# Patient Record
Sex: MALE | Race: OTHER | URBAN - METROPOLITAN AREA
[De-identification: names, ages, dates, MRNs, and addresses within clinical notes are randomized per-mention and may not be internally consistent; named-entity substitution may affect disease eponyms.]

---

## 2018-08-22 ENCOUNTER — OFFICE VISIT (OUTPATIENT)
Dept: INTERNAL MEDICINE CLINIC | Facility: CLINIC | Age: 18
End: 2018-08-22
Payer: COMMERCIAL

## 2018-08-22 VITALS
OXYGEN SATURATION: 97 % | WEIGHT: 253.19 LBS | DIASTOLIC BLOOD PRESSURE: 76 MMHG | BODY MASS INDEX: 33.92 KG/M2 | SYSTOLIC BLOOD PRESSURE: 116 MMHG | HEIGHT: 72.5 IN | HEART RATE: 80 BPM | TEMPERATURE: 98 F

## 2018-08-22 DIAGNOSIS — M25.50 ARTHRALGIA, UNSPECIFIED JOINT: ICD-10-CM

## 2018-08-22 DIAGNOSIS — Z76.89 ENCOUNTER TO ESTABLISH CARE: Primary | ICD-10-CM

## 2018-08-22 PROBLEM — M25.571 RIGHT ANKLE PAIN: Status: ACTIVE | Noted: 2018-08-22

## 2018-08-22 PROBLEM — M25.531 RIGHT WRIST PAIN: Status: ACTIVE | Noted: 2018-08-22

## 2018-08-22 LAB
ALBUMIN SERPL BCP-MCNC: 4.5 G/DL (ref 3.5–4.8)
ALBUMIN/GLOB SERPL: 1.7 {RATIO} (ref 1–2)
ALP SERPL-CCNC: 57 U/L (ref 39–325)
ALT SERPL-CCNC: 22 U/L (ref 17–63)
ANION GAP SERPL CALC-SCNC: 8 MMOL/L (ref 0–18)
AST SERPL-CCNC: 25 U/L (ref 15–41)
BASOPHILS # BLD: 0.1 K/UL (ref 0–0.2)
BASOPHILS NFR BLD: 1 %
BILIRUB SERPL-MCNC: 0.1 MG/DL (ref 0.3–1.2)
BUN SERPL-MCNC: 13 MG/DL (ref 8–20)
BUN/CREAT SERPL: 12.3 (ref 10–20)
CALCIUM SERPL-MCNC: 9.8 MG/DL (ref 8.5–10.5)
CHLORIDE SERPL-SCNC: 102 MMOL/L (ref 95–110)
CO2 SERPL-SCNC: 29 MMOL/L (ref 22–32)
CREAT SERPL-MCNC: 1.06 MG/DL (ref 0.5–1.5)
CRP SERPL HS-MCNC: 1.6 MG/L (ref 0–7.5)
EOSINOPHIL # BLD: 0.2 K/UL (ref 0–0.7)
EOSINOPHIL NFR BLD: 2 %
ERYTHROCYTE [DISTWIDTH] IN BLOOD BY AUTOMATED COUNT: 12.7 % (ref 11–15)
ERYTHROCYTE [SEDIMENTATION RATE] IN BLOOD: 4 MM/HR (ref 0–15)
GLOBULIN PLAS-MCNC: 2.6 G/DL (ref 2.5–3.7)
GLUCOSE SERPL-MCNC: 97 MG/DL (ref 70–99)
HCT VFR BLD AUTO: 45.3 % (ref 41–52)
HGB BLD-MCNC: 14.9 G/DL (ref 13.5–17.5)
LYMPHOCYTES # BLD: 2.9 K/UL (ref 1–4)
LYMPHOCYTES NFR BLD: 33 %
MCH RBC QN AUTO: 27.1 PG (ref 27–32)
MCHC RBC AUTO-ENTMCNC: 32.9 G/DL (ref 32–37)
MCV RBC AUTO: 82.5 FL (ref 80–100)
MONOCYTES # BLD: 0.7 K/UL (ref 0–1)
MONOCYTES NFR BLD: 7 %
NEUTROPHILS # BLD AUTO: 5.1 K/UL (ref 1.8–7.7)
NEUTROPHILS NFR BLD: 57 %
OSMOLALITY UR CALC.SUM OF ELEC: 288 MOSM/KG (ref 275–295)
PATIENT FASTING: NO
PLATELET # BLD AUTO: 269 K/UL (ref 140–400)
PMV BLD AUTO: 7.5 FL (ref 7.4–10.3)
POTASSIUM SERPL-SCNC: 4.4 MMOL/L (ref 3.3–5.1)
PROT SERPL-MCNC: 7.1 G/DL (ref 5.9–8.4)
RBC # BLD AUTO: 5.49 M/UL (ref 4.5–5.9)
SODIUM SERPL-SCNC: 139 MMOL/L (ref 136–144)
WBC # BLD AUTO: 8.9 K/UL (ref 4–11)

## 2018-08-22 PROCEDURE — 99203 OFFICE O/P NEW LOW 30 MIN: CPT | Performed by: INTERNAL MEDICINE

## 2018-08-22 PROCEDURE — 36415 COLL VENOUS BLD VENIPUNCTURE: CPT | Performed by: INTERNAL MEDICINE

## 2018-08-23 NOTE — PATIENT INSTRUCTIONS
Encounter to establish care  (primary encounter diagnosis)  Arthralgia, unspecified joint check crp ,esr    right wrist pain- negative test for carpal tunnel- use wrist splint at night , ibuprofen as needed , if  persist follow up     Right ankle pain- exm

## 2018-08-23 NOTE — PROGRESS NOTES
HPI:    Patient ID: Irene De La Cruz is a 19year old male. HPI Right ankle pain  ongoing for few months , worse when he walks long distance . No pain at rest . Per him pain radiates to his leg . Denies any pain now . No injury . Used to play football not bruise/bleed easily. Psychiatric/Behavioral: Negative for agitation, behavioral problems, confusion, hallucinations and sleep disturbance. The patient is not nervous/anxious. No current outpatient prescriptions on file.   Allergies:No Known discharge. No scleral icterus. Neck: Normal range of motion. Neck supple. No JVD present. No tracheal tenderness present. No tracheal deviation present. No thyroid mass and no thyromegaly present.    Cardiovascular: Normal rate, regular rhythm, normal hea behavior is normal.   Vitals reviewed.            ASSESSMENT/PLAN:   Encounter to establish care  (primary encounter diagnosis)  Arthralgia, unspecified joint check crp ,esr    right wrist pain- negative test for carpal tunnel- use wrist splint at night , i

## 2025-04-24 NOTE — H&P
[facility] - Gastroenterology                                                                                                               Reason for consult: abdominal pain    Requesting physician or provider: BRAXTON WATKINS MD    Chief Complaint   Patient presents with    Consult     Sharp Abdominal pain/ pt believes it a small bump on abdomen  and Groin        HPI:   Rommel MATOS is a 25 year old year-old male with medical history of depression.     he is here today for evaluation of lower abdominal pain x2 months.   Has been keeping a food diary, has not found any specific triggers.  Has sharp pain the AM, feels achy the rest of the day.  Baseline is 1-2 bowel movements per day, now going a few times a day, having feeling of incomplete evacuation.  Added a fiber supplement, helps keep stool solid.       Patient denies symptoms of nausea, vomiting, dyspepsia, dysphagia, odynophagia, globus sensation, heartburn, hematemesis, hematochezia, or melena. he denies recent change in appetite, fever or unintentional weight loss.      Last colonoscopy: none   Last EGD: none     NSAIDS: none  Tobacco: none  Alcohol: social   Marijuana: daily   Illicit drugs: none    No family history of GI malignancy or IBD.     No history of adverse reaction to sedation  No ELMA  No anticoagulants/antiplatelet  No pacemaker/defibrillator    Wt Readings from Last 6 Encounters:   04/24/25 237 lb (107.5 kg)   08/22/18 253 lb 3.2 oz (114.9 kg)        History, Medications, Allergies, ROS:      Past Medical History[1]   Past Surgical History[2]   Family Hx: Family History[3]   Social History: Short Social Hx on File[4]     Medications (Active prior to today's visit):  Current Medications[5]    Allergies:  Allergies[6]    ROS:   CONSTITUTIONAL: negative for fevers, chills, sweats  EYES Negative for scleral icterus or redness, and diplopia  HEENT:  Negative for hoarseness  RESPIRATORY: Negative for cough and severe shortness of breath  CARDIOVASCULAR: Negative for crushing sub-sternal chest pain  GASTROINTESTINAL: See HPI  GENITOURINARY: Negative for dysuria  MUSCULOSKELETAL: Negative for arthralgias and myalgias  SKIN: Negative for jaundice, rash or pruritus  NEUROLOGICAL: Negative for dizziness and headaches  BEHAVIOR/PSYCH: Negative for psychotic behavior    PHYSICAL EXAM:   Blood pressure 118/75, pulse 72, weight 237 lb (107.5 kg).    GEN: Alert, no acute distress, well-nourished   ABDOMEN: Soft, symmetrical, non-tender without distention or guarding. No scars or lesions. Umbilicus is midline without herniation. Normoactive bowel sounds are present.  MSK: No erythema, no warmth, no swelling of joints  SKIN: No jaundice, no erythema, no rashes, no lesions  HEMATOLOGIC: No bleeding, no bruising  NEURO: Alert and interactive, ARREOLA  PSYCH: appropriate mood & affect    Labs/Imaging/Procedures:     Patient's pertinent labs and imaging were reviewed and discussed with patient today.        .  ASSESSMENT/PLAN:   Rommel AMTOS is a 25 year old year-old male with medical history of depression.     1. RLQ abdominal pain  - CT ABDOMEN+PELVIS(CONTRAST ONLY)(CPT=74177); Future    2. Change in bowel habit  - CT ABDOMEN+PELVIS(CONTRAST ONLY)(CPT=74177); Future     Recommend starting with CT scan for further evaluation.  Continue daily fiber supplement.  Follow up pending results.       There are no Patient Instructions on file for this visit.     Orders This Visit:  No orders of the defined types were placed in this encounter.      Meds This Visit:  Requested Prescriptions      No prescriptions requested or ordered in this encounter       Imaging & Referrals:  CT ABDOMEN+PELVIS(CONTRAST ONLY)(CSQ=29081)      LIZ Sagastume    [facility] Gastroenterology  4/24/2025               [1] History reviewed. No pertinent past medical history.  [2] History  reviewed. No pertinent surgical history.  [3]   Family History  Problem Relation Age of Onset    Hypertension Father     No Known Problems Mother     No Known Problems Daughter     No Known Problems Son     No Known Problems Maternal Grandmother     No Known Problems Paternal Grandmother     Cancer Paternal Grandfather    [4]   Social History  Socioeconomic History    Marital status: Single   Tobacco Use    Smoking status: Never    Smokeless tobacco: Never   Vaping Use    Vaping status: Every Day    Start date: 3/1/2017   Substance and Sexual Activity    Alcohol use: Yes     Alcohol/week: 2.0 standard drinks of alcohol     Types: 2 Shots of liquor per week     Comment: 2 shots per month    Drug use: No     Social Drivers of Health     Food Insecurity: No Food Insecurity (3/11/2025)    Received from [facility]    Hunger Vital Sign     Worried About Running Out of Food in the Last Year: Never true     Ran Out of Food in the Last Year: Never true   Transportation Needs: No Transportation Needs (3/11/2025)    Received from [facility]    PRAPARE - Transportation     Lack of Transportation (Medical): No     Lack of Transportation (Non-Medical): No   Stress: Stress Concern Present (3/11/2025)    Received from [facility]    [employer] - Occupational Stress Questionnaire     Feeling of Stress : To some extent   Housing Stability: Low Risk  (3/11/2025)    Received from [facility]    Housing Stability Vital Sign     Unable to Pay for Housing in the Last Year: No     Number of Times Moved in the Last Year: 0     Homeless in the Last Year: No   [5]   Current Outpatient Medications   Medication Sig Dispense Refill    escitalopram 10 MG Oral Tab Take 1 tablet (10 mg total) by mouth daily.     [6] No Known Allergies

## 2025-05-07 NOTE — TELEPHONE ENCOUNTER
I called and left a voicemail.   CT showing large stool burden. I again sent the link to set up his mychart so he can complete the Miralax cleanse.   If he doesn't set up the mychart I guess we may have to mail the instructions?     -------------------  Clearing fecal retention with Miralax (or Dulcolax Balance) and Gatorade    1. Buy one 238 gram bottle of Miralax or Dulcolax Balance. Buy 64 oz of the Gatorade flavor of your choice.     2. Pour the contents of the MiraLAX bottle and the 64 oz of. Gatorade into a pitcher and stir. Let the \"punch\" sit for five minutes for the solution to fully dissolve. This will taste better if you drink it cold.     3. Drink 8 oz. of the \"punch\" every 20 to 30 minutes until completed. Okay to have clear liquids while drinking the laxative. Do not eat solid food while drinking the washout solution.    4. Once completed the solution, wait a few hours and then okay to have a light meal later in the day.

## 2025-05-08 NOTE — TELEPHONE ENCOUNTER
Spoke with patient went over CT results and instructions for Miralax cleanse. Offered to mail patient instructions, patient declines states he will set up my chart and let us know once it's been set up.